# Patient Record
Sex: MALE | Race: WHITE | NOT HISPANIC OR LATINO | URBAN - METROPOLITAN AREA
[De-identification: names, ages, dates, MRNs, and addresses within clinical notes are randomized per-mention and may not be internally consistent; named-entity substitution may affect disease eponyms.]

---

## 2021-06-24 ENCOUNTER — OFFICE (OUTPATIENT)
Dept: URBAN - METROPOLITAN AREA CLINIC 67 | Facility: CLINIC | Age: 61
End: 2021-06-24

## 2021-06-24 VITALS
WEIGHT: 219 LBS | DIASTOLIC BLOOD PRESSURE: 80 MMHG | TEMPERATURE: 97.4 F | SYSTOLIC BLOOD PRESSURE: 150 MMHG | HEART RATE: 85 BPM | HEIGHT: 71 IN

## 2021-06-24 DIAGNOSIS — Z86.010 PERSONAL HISTORY OF COLONIC POLYPS: ICD-10-CM

## 2021-06-24 DIAGNOSIS — K50.80 CROHN'S DISEASE OF BOTH SMALL AND LARGE INTESTINE WITHOUT CO: ICD-10-CM

## 2021-06-24 DIAGNOSIS — Z90.49 ACQUIRED ABSENCE OF OTHER SPECIFIED PARTS OF DIGESTIVE TRACT: ICD-10-CM

## 2021-06-24 PROCEDURE — 99214 OFFICE O/P EST MOD 30 MIN: CPT | Performed by: SPECIALIST

## 2021-12-08 ENCOUNTER — OFFICE (OUTPATIENT)
Dept: URBAN - METROPOLITAN AREA CLINIC 67 | Facility: CLINIC | Age: 61
End: 2021-12-08

## 2021-12-08 VITALS
TEMPERATURE: 97.3 F | WEIGHT: 226 LBS | SYSTOLIC BLOOD PRESSURE: 126 MMHG | OXYGEN SATURATION: 97 % | HEART RATE: 87 BPM | DIASTOLIC BLOOD PRESSURE: 80 MMHG | HEIGHT: 71 IN

## 2021-12-08 DIAGNOSIS — K92.1 MELENA: ICD-10-CM

## 2021-12-08 DIAGNOSIS — K50.90 CROHN'S DISEASE, UNSPECIFIED, WITHOUT COMPLICATIONS: ICD-10-CM

## 2021-12-08 PROCEDURE — 99214 OFFICE O/P EST MOD 30 MIN: CPT | Performed by: SPECIALIST

## 2021-12-08 RX ORDER — SODIUM SULFATE, POTASSIUM SULFATE, MAGNESIUM SULFATE 17.5; 3.13; 1.6 G/ML; G/ML; G/ML
SOLUTION, CONCENTRATE ORAL
Qty: 1 | Refills: 0 | Status: ACTIVE
Start: 2021-12-08

## 2021-12-08 NOTE — SERVICEHPINOTES
You Trivedi   is seen today for a follow-up visit.     Medications have included Humira. Reason for encounter: for routine follow-up. ileum and ascending colon. Surgical history: ileal resection with ileocolonic anastomosis. Current medication use: compliant with dosing regimen and experiencing no side effects. The onset of the Crohn's Disease has been gradual and has been occurring in a decreasing pattern for years (several). The course has been rapidly improving. The Crohn's Disease is described as moderate. The frequency of bowel movements has been 4 per day (4-5). The stools are loose and not formed. Previous evaluations have included colonoscopy (5/09 ileocolonic anastomosis with ulceration and stenosis. 9/2014. 8/2019). Impact of disease: impact on work/school-mild. The Crohn's Disease are characterized as completely controlled. Patient has been compliant with post operative instructions. Patient has Crohn's disease in remission. Note for "Crohn's Disease": On Humira and doing well. Denies abd pain, hematochezia, wt loss. 4 BMs per day, semi solid. 
br
br   3 weeks ago had 3 episodes of blood in stool.  one day. moderate.  No abdominal pain.

## 2021-12-15 ENCOUNTER — AMBULATORY SURGICAL CENTER (OUTPATIENT)
Dept: URBAN - METROPOLITAN AREA SURGERY 19 | Facility: SURGERY | Age: 61
End: 2021-12-15
Payer: COMMERCIAL

## 2021-12-15 DIAGNOSIS — K92.1 MELENA: ICD-10-CM

## 2021-12-15 LAB
COLONOSCOPY STUDY: (no result)
COLONOSCOPY STUDY: (no result)

## 2021-12-15 PROCEDURE — 45378 DIAGNOSTIC COLONOSCOPY: CPT | Performed by: SPECIALIST

## 2022-06-16 ENCOUNTER — OFFICE (OUTPATIENT)
Dept: URBAN - METROPOLITAN AREA CLINIC 67 | Facility: CLINIC | Age: 62
End: 2022-06-16

## 2022-06-16 VITALS
DIASTOLIC BLOOD PRESSURE: 84 MMHG | WEIGHT: 214 LBS | HEART RATE: 87 BPM | OXYGEN SATURATION: 97 % | HEIGHT: 71 IN | SYSTOLIC BLOOD PRESSURE: 132 MMHG

## 2022-06-16 DIAGNOSIS — Z90.49 ACQUIRED ABSENCE OF OTHER SPECIFIED PARTS OF DIGESTIVE TRACT: ICD-10-CM

## 2022-06-16 DIAGNOSIS — Z86.010 PERSONAL HISTORY OF COLONIC POLYPS: ICD-10-CM

## 2022-06-16 DIAGNOSIS — K50.80 CROHN'S DISEASE OF BOTH SMALL AND LARGE INTESTINE WITHOUT CO: ICD-10-CM

## 2022-06-16 PROCEDURE — 99214 OFFICE O/P EST MOD 30 MIN: CPT | Performed by: SPECIALIST

## 2022-07-27 ENCOUNTER — INPATIENT HOSPITAL (OUTPATIENT)
Dept: URBAN - METROPOLITAN AREA MEDICAL CENTER 11 | Facility: MEDICAL CENTER | Age: 62
End: 2022-07-27
Payer: COMMERCIAL

## 2022-07-27 DIAGNOSIS — D72.829 ELEVATED WHITE BLOOD CELL COUNT, UNSPECIFIED: ICD-10-CM

## 2022-07-27 DIAGNOSIS — R93.5 ABNORMAL FINDINGS ON DIAGNOSTIC IMAGING OF OTHER ABDOMINAL R: ICD-10-CM

## 2022-07-27 DIAGNOSIS — I10 ESSENTIAL (PRIMARY) HYPERTENSION: ICD-10-CM

## 2022-07-27 DIAGNOSIS — K56.609 UNSPECIFIED INTESTINAL OBSTRUCTION, UNSPECIFIED AS TO PARTIA: ICD-10-CM

## 2022-07-27 DIAGNOSIS — Z98.890 OTHER SPECIFIED POSTPROCEDURAL STATES: ICD-10-CM

## 2022-07-27 DIAGNOSIS — K50.812 CROHN'S DISEASE OF BOTH SMALL AND LARGE INTESTINE WITH INTES: ICD-10-CM

## 2022-07-27 PROCEDURE — 99232 SBSQ HOSP IP/OBS MODERATE 35: CPT | Performed by: SPECIALIST

## 2022-07-27 PROCEDURE — 99222 1ST HOSP IP/OBS MODERATE 55: CPT | Performed by: SPECIALIST

## 2022-07-29 ENCOUNTER — INPATIENT HOSPITAL (OUTPATIENT)
Dept: URBAN - METROPOLITAN AREA MEDICAL CENTER 11 | Facility: MEDICAL CENTER | Age: 62
End: 2022-07-29

## 2022-07-29 DIAGNOSIS — E80.7 DISORDER OF BILIRUBIN METABOLISM, UNSPECIFIED: ICD-10-CM

## 2022-07-29 DIAGNOSIS — K50.812 CROHN'S DISEASE OF BOTH SMALL AND LARGE INTESTINE WITH INTES: ICD-10-CM

## 2022-07-29 PROCEDURE — 99232 SBSQ HOSP IP/OBS MODERATE 35: CPT | Performed by: SPECIALIST

## 2022-08-24 ENCOUNTER — OFFICE (OUTPATIENT)
Dept: URBAN - METROPOLITAN AREA CLINIC 67 | Facility: CLINIC | Age: 62
End: 2022-08-24

## 2022-08-24 VITALS
SYSTOLIC BLOOD PRESSURE: 124 MMHG | HEART RATE: 106 BPM | HEIGHT: 71 IN | WEIGHT: 217 LBS | DIASTOLIC BLOOD PRESSURE: 76 MMHG

## 2022-08-24 DIAGNOSIS — Z87.19 PERSONAL HISTORY OF OTHER DISEASES OF THE DIGESTIVE SYSTEM: ICD-10-CM

## 2022-08-24 DIAGNOSIS — K50.80 CROHN'S DISEASE OF BOTH SMALL AND LARGE INTESTINE WITHOUT CO: ICD-10-CM

## 2022-08-24 PROCEDURE — 99215 OFFICE O/P EST HI 40 MIN: CPT | Performed by: SPECIALIST

## 2022-08-24 RX ORDER — SODIUM SULFATE, POTASSIUM SULFATE, MAGNESIUM SULFATE 17.5; 3.13; 1.6 G/ML; G/ML; G/ML
SOLUTION, CONCENTRATE ORAL
Qty: 1 | Refills: 0 | Status: COMPLETED
Start: 2022-08-24 | End: 2022-11-29

## 2022-11-02 ENCOUNTER — AMBULATORY SURGICAL CENTER (OUTPATIENT)
Dept: URBAN - METROPOLITAN AREA SURGERY 19 | Facility: SURGERY | Age: 62
End: 2022-11-02
Payer: COMMERCIAL

## 2022-11-02 ENCOUNTER — OFFICE (OUTPATIENT)
Dept: URBAN - METROPOLITAN AREA PATHOLOGY 24 | Facility: PATHOLOGY | Age: 62
End: 2022-11-02
Payer: COMMERCIAL

## 2022-11-02 DIAGNOSIS — K50.10 CROHN'S DISEASE OF LARGE INTESTINE WITHOUT COMPLICATIONS: ICD-10-CM

## 2022-11-02 LAB
COLONOSCOPY STUDY: (no result)
COLONOSCOPY STUDY: (no result)

## 2022-11-02 PROCEDURE — 88305 TISSUE EXAM BY PATHOLOGIST: CPT | Performed by: STUDENT IN AN ORGANIZED HEALTH CARE EDUCATION/TRAINING PROGRAM

## 2022-11-02 PROCEDURE — 88342 IMHCHEM/IMCYTCHM 1ST ANTB: CPT | Performed by: STUDENT IN AN ORGANIZED HEALTH CARE EDUCATION/TRAINING PROGRAM

## 2022-11-02 PROCEDURE — 45380 COLONOSCOPY AND BIOPSY: CPT | Performed by: SPECIALIST

## 2022-11-29 ENCOUNTER — OFFICE (OUTPATIENT)
Dept: URBAN - METROPOLITAN AREA CLINIC 67 | Facility: CLINIC | Age: 62
End: 2022-11-29
Payer: COMMERCIAL

## 2022-11-29 VITALS
HEIGHT: 71 IN | SYSTOLIC BLOOD PRESSURE: 154 MMHG | RESPIRATION RATE: 14 BRPM | DIASTOLIC BLOOD PRESSURE: 97 MMHG | TEMPERATURE: 98 F | DIASTOLIC BLOOD PRESSURE: 88 MMHG | WEIGHT: 228 LBS | SYSTOLIC BLOOD PRESSURE: 149 MMHG | OXYGEN SATURATION: 95 % | TEMPERATURE: 97.7 F | SYSTOLIC BLOOD PRESSURE: 161 MMHG | HEART RATE: 73 BPM | OXYGEN SATURATION: 98 % | HEART RATE: 78 BPM | DIASTOLIC BLOOD PRESSURE: 93 MMHG

## 2022-11-29 DIAGNOSIS — K50.90 CROHN'S DISEASE, UNSPECIFIED, WITHOUT COMPLICATIONS: ICD-10-CM

## 2022-11-29 PROCEDURE — 96365 THER/PROPH/DIAG IV INF INIT: CPT | Performed by: SPECIALIST

## 2022-11-29 RX ADMIN — USTEKINUMAB 520 MG: 130 SOLUTION INTRAVENOUS at 14:40

## 2022-11-29 NOTE — SERVICEHPINOTES
See follow-up visit from:   8/24/2022   
br   Date &amp Results of last TB test:   10/6/2022     Negative   
br   Labs and/or Additional Orders: 
br Insurance authorization: *sampled from [employer]*br Pharmacy:   [facility]   
br   Rate of Infusion:  Standard  brInfusion order placed on:  9/30/2022   
br 
Time out performed with:  Tawanda Sommer

## 2023-01-24 ENCOUNTER — OFFICE (OUTPATIENT)
Dept: URBAN - METROPOLITAN AREA CLINIC 67 | Facility: CLINIC | Age: 63
End: 2023-01-24

## 2023-01-24 VITALS — HEIGHT: 71 IN

## 2023-01-24 DIAGNOSIS — K50.90 CROHN'S DISEASE, UNSPECIFIED, WITHOUT COMPLICATIONS: ICD-10-CM

## 2023-01-24 PROCEDURE — 99211 OFF/OP EST MAY X REQ PHY/QHP: CPT | Performed by: SPECIALIST

## 2023-01-24 NOTE — SERVICENOTES
Patient watched 5 minute video on self-injection with Stelara and successfully injected his first 90mg SC dose.  F/U visit scheduled with Portia Noel NP.

## 2023-02-28 ENCOUNTER — OFFICE (OUTPATIENT)
Dept: URBAN - METROPOLITAN AREA CLINIC 67 | Facility: CLINIC | Age: 63
End: 2023-02-28

## 2023-02-28 VITALS
DIASTOLIC BLOOD PRESSURE: 84 MMHG | HEIGHT: 71 IN | HEART RATE: 49 BPM | SYSTOLIC BLOOD PRESSURE: 152 MMHG | RESPIRATION RATE: 14 BRPM | WEIGHT: 228 LBS

## 2023-02-28 DIAGNOSIS — K58.0 IRRITABLE BOWEL SYNDROME WITH DIARRHEA: ICD-10-CM

## 2023-02-28 DIAGNOSIS — R14.0 ABDOMINAL DISTENSION (GASEOUS): ICD-10-CM

## 2023-02-28 DIAGNOSIS — E55.9 VITAMIN D DEFICIENCY, UNSPECIFIED: ICD-10-CM

## 2023-02-28 DIAGNOSIS — K50.90 CROHN'S DISEASE, UNSPECIFIED, WITHOUT COMPLICATIONS: ICD-10-CM

## 2023-02-28 PROCEDURE — 99214 OFFICE O/P EST MOD 30 MIN: CPT

## 2023-02-28 RX ORDER — RIFAXIMIN 550 MG/1
1650 TABLET ORAL
Qty: 42 | Refills: 0 | Status: ACTIVE
Start: 2023-02-28

## 2023-03-03 ENCOUNTER — APPOINTMENT (OUTPATIENT)
Dept: URBAN - METROPOLITAN AREA SURGERY 11 | Age: 63
Setting detail: DERMATOLOGY
End: 2023-03-03

## 2023-03-03 DIAGNOSIS — L0391 CELLULITIS AND ABSCESS OF UNSPECIFIED SITES: ICD-10-CM

## 2023-03-03 DIAGNOSIS — L0390 CELLULITIS AND ABSCESS OF UNSPECIFIED SITES: ICD-10-CM

## 2023-03-03 DIAGNOSIS — L21.8 OTHER SEBORRHEIC DERMATITIS: ICD-10-CM

## 2023-03-03 DIAGNOSIS — L82.1 OTHER SEBORRHEIC KERATOSIS: ICD-10-CM

## 2023-03-03 PROBLEM — L03.818 CELLULITIS OF OTHER SITES: Status: ACTIVE | Noted: 2023-03-03

## 2023-03-03 PROCEDURE — 99204 OFFICE O/P NEW MOD 45 MIN: CPT

## 2023-03-03 PROCEDURE — OTHER PRESCRIPTION: OTHER

## 2023-03-03 PROCEDURE — OTHER COUNSELING: OTHER

## 2023-03-03 PROCEDURE — OTHER MIPS QUALITY: OTHER

## 2023-03-03 PROCEDURE — OTHER MEDICATION COUNSELING: OTHER

## 2023-03-03 RX ORDER — CIPROFLOXACIN HYDROCHLORIDE 500 MG/1
TABLET, FILM COATED ORAL
Qty: 14 | Refills: 0 | Status: ERX | COMMUNITY
Start: 2023-03-03

## 2023-03-03 RX ORDER — CLOBETASOL PROPIONATE 0.5 MG/G
OINTMENT TOPICAL
Qty: 60 | Refills: 0 | Status: ERX | COMMUNITY
Start: 2023-03-03

## 2023-03-03 ASSESSMENT — LOCATION SIMPLE DESCRIPTION DERM
LOCATION SIMPLE: SCALP
LOCATION SIMPLE: RIGHT TEMPLE
LOCATION SIMPLE: RIGHT EAR
LOCATION SIMPLE: RIGHT PRETIBIAL REGION

## 2023-03-03 ASSESSMENT — LOCATION ZONE DERM
LOCATION ZONE: SCALP
LOCATION ZONE: LEG
LOCATION ZONE: FACE
LOCATION ZONE: EAR

## 2023-03-03 ASSESSMENT — LOCATION DETAILED DESCRIPTION DERM
LOCATION DETAILED: RIGHT PROXIMAL PRETIBIAL REGION
LOCATION DETAILED: RIGHT INFERIOR FRONTAL SCALP
LOCATION DETAILED: RIGHT POSTAURICULAR CREASE
LOCATION DETAILED: RIGHT LATERAL TEMPLE

## 2023-03-03 NOTE — PROCEDURE: MEDICATION COUNSELING
Controlled with current regime Libtayo Counseling- I discussed with the patient the risks of Libtayo including but not limited to nausea, vomiting, diarrhea, and bone or muscle pain.  The patient verbalized understanding of the proper use and possible adverse effects of Libtayo.  All of the patient's questions and concerns were addressed.

## 2023-03-03 NOTE — PROCEDURE: MIPS QUALITY
Start Norvasc 2.5 mg po daily.  Continue to monitor BP, HR.
Detail Level: Detailed
Quality 110: Preventive Care And Screening: Influenza Immunization: Influenza Immunization Administered during Influenza season

## 2023-03-03 NOTE — PROCEDURE: MEDICATION COUNSELING
details… Siliq Counseling:  I discussed with the patient the risks of Siliq including but not limited to new or worsening depression, suicidal thoughts and behavior, immunosuppression, malignancy, posterior leukoencephalopathy syndrome, and serious infections.  The patient understands that monitoring is required including a PPD at baseline and must alert us or the primary physician if symptoms of infection or other concerning signs are noted. There is also a special program designed to monitor depression which is required with Siliq.

## 2023-03-21 ENCOUNTER — APPOINTMENT (OUTPATIENT)
Dept: URBAN - METROPOLITAN AREA SURGERY 11 | Age: 63
Setting detail: DERMATOLOGY
End: 2023-03-21

## 2023-03-21 DIAGNOSIS — L81.4 OTHER MELANIN HYPERPIGMENTATION: ICD-10-CM

## 2023-03-21 DIAGNOSIS — L82.1 OTHER SEBORRHEIC KERATOSIS: ICD-10-CM

## 2023-03-21 DIAGNOSIS — D22 MELANOCYTIC NEVI: ICD-10-CM

## 2023-03-21 DIAGNOSIS — L0390 CELLULITIS AND ABSCESS OF UNSPECIFIED SITES: ICD-10-CM

## 2023-03-21 DIAGNOSIS — L57.0 ACTINIC KERATOSIS: ICD-10-CM

## 2023-03-21 DIAGNOSIS — L0391 CELLULITIS AND ABSCESS OF UNSPECIFIED SITES: ICD-10-CM

## 2023-03-21 PROBLEM — D22.5 MELANOCYTIC NEVI OF TRUNK: Status: ACTIVE | Noted: 2023-03-21

## 2023-03-21 PROBLEM — L03.818 CELLULITIS OF OTHER SITES: Status: ACTIVE | Noted: 2023-03-21

## 2023-03-21 PROCEDURE — OTHER COUNSELING: OTHER

## 2023-03-21 PROCEDURE — 17000 DESTRUCT PREMALG LESION: CPT

## 2023-03-21 PROCEDURE — 99213 OFFICE O/P EST LOW 20 MIN: CPT | Mod: 25

## 2023-03-21 PROCEDURE — OTHER LIQUID NITROGEN: OTHER

## 2023-03-21 PROCEDURE — OTHER MIPS QUALITY: OTHER

## 2023-03-21 ASSESSMENT — LOCATION DETAILED DESCRIPTION DERM
LOCATION DETAILED: RIGHT MEDIAL SUPERIOR CHEST
LOCATION DETAILED: LEFT SUPERIOR POSTERIOR HELIX
LOCATION DETAILED: EPIGASTRIC SKIN
LOCATION DETAILED: PERIUMBILICAL SKIN
LOCATION DETAILED: SUBXIPHOID
LOCATION DETAILED: RIGHT POSTAURICULAR CREASE

## 2023-03-21 ASSESSMENT — LOCATION SIMPLE DESCRIPTION DERM
LOCATION SIMPLE: LEFT EAR
LOCATION SIMPLE: CHEST
LOCATION SIMPLE: ABDOMEN
LOCATION SIMPLE: ABDOMEN
LOCATION SIMPLE: RIGHT EAR

## 2023-03-21 ASSESSMENT — LOCATION ZONE DERM
LOCATION ZONE: EAR
LOCATION ZONE: TRUNK
LOCATION ZONE: TRUNK

## 2023-03-21 NOTE — PROCEDURE: LIQUID NITROGEN
Render Note In Bullet Format When Appropriate: No
Duration Of Freeze Thaw-Cycle (Seconds): 15
Post-Care Instructions: I reviewed with the patient in detail post-care instructions. Patient is to wear sunprotection, and avoid picking at any of the treated lesions. Pt may apply Vaseline to crusted or scabbing areas.
Show Applicator Variable?: Yes
Consent: The patient's consent was obtained including but not limited to risks of crusting, scabbing, blistering, scarring, darker or lighter pigmentary change, recurrence, incomplete removal and infection.
Detail Level: Detailed

## 2023-07-11 ENCOUNTER — OFFICE (OUTPATIENT)
Dept: URBAN - METROPOLITAN AREA CLINIC 67 | Facility: CLINIC | Age: 63
End: 2023-07-11

## 2023-07-11 VITALS
HEIGHT: 71 IN | WEIGHT: 225 LBS | DIASTOLIC BLOOD PRESSURE: 88 MMHG | OXYGEN SATURATION: 98 % | SYSTOLIC BLOOD PRESSURE: 130 MMHG | HEART RATE: 86 BPM

## 2023-07-11 DIAGNOSIS — K58.0 IRRITABLE BOWEL SYNDROME WITH DIARRHEA: ICD-10-CM

## 2023-07-11 DIAGNOSIS — R15.2 FECAL URGENCY: ICD-10-CM

## 2023-07-11 DIAGNOSIS — K52.9 NONINFECTIVE GASTROENTERITIS AND COLITIS, UNSPECIFIED: ICD-10-CM

## 2023-07-11 DIAGNOSIS — R10.84 GENERALIZED ABDOMINAL PAIN: ICD-10-CM

## 2023-07-11 DIAGNOSIS — K50.80 CROHN'S DISEASE OF BOTH SMALL AND LARGE INTESTINE WITHOUT CO: ICD-10-CM

## 2023-07-11 DIAGNOSIS — E55.9 VITAMIN D DEFICIENCY, UNSPECIFIED: ICD-10-CM

## 2023-07-11 PROCEDURE — 99214 OFFICE O/P EST MOD 30 MIN: CPT

## 2023-07-11 RX ORDER — DICYCLOMINE HYDROCHLORIDE 10 MG/1
CAPSULE ORAL
Qty: 90 | Refills: 1 | Status: COMPLETED
Start: 2023-07-11 | End: 2024-05-09

## 2023-07-11 NOTE — SERVICENOTES
1. Continue Stelara. Travis Darby score 3
2. Plan for bone density at [facility]
3. Try over the counter fiber, like benefiber or metamucil, daily. 1 teaspoon or gummy. 
4.  600-800IU of vitamin D daily
5. Will send in dicyclomine to try before meals to help with urgency. 
6. Labs today.
7. colonoscopy to confirm remission after starting Stelara earlier this year.

## 2023-07-11 NOTE — SERVICEHPINOTES
You Trivedi   is seen today for a follow-up visit.    Lou returns to discuss Crohns. br-insurance needs a Travis Darby index form completed for renewing his stelara.br-.well being- 0
br abdominal pain- 0
br # of liquid stools- 3
br Abdominal mass- 0
br arthralgia- 0
br uveitis- 0
br erythema nodosum- 0
br aphthous ulcers- 0
br pyoderma gangrenosum- 0
br anal fissure- 0
br fistula- 0
br [id number] trialed him on xifaxan at last OV for potential IBS-diarrhea, and planned for 3 month f/u with calprotectin stool test. He reports urgency/loose stools after eating. No pain. Has had several inches of colon removed. Denies blood in the stools. Symptoms feel the same as when he was on Humira in years past. 1st injection of Stelara was 1/24/23. br-He reports not getting DEXA since last OV. br-2/28/23- CBC okay, T bili 1.9, , Vit D 29.1, b12, folate normal. Recommend 600-800IU vitamin D replacement. He has not started vitamin D supplement.
br
br-he completed prescription cream with dermatologist for the candida behind his ear. None today. Last seen 2/28/23 with me as follows-Kapil Trivedi is seen today for a follow-up visit. Patient returns to discuss Crohns, On stelara now. From Humira in the fall.brFirst Stelara infusion was 11/29/22, first injection was 1/24/23.brHe reports doing well, 4-5 BMs daily, blood occasionally, urgency and bloating are daily. Normal for him. He reports having new fungus behind the right ear, since starting stelara, is using a topical fungal cream for this, not helping, seeing dermatologist Friday, [facility] in [place]. brReports 1 DEXA scan at the time of the crohns diagnosis. brWas hospitalized with small bowel obstruction in the fall, resolved, and completed prednisone taper around this time.Dx 2007 with Crohns, started on pentasa. Switched to Humira in 6/2009.Last OV note with Dr Freire 8/24/22 as follows-brhistory of crohn's ileocolitis post resection. rx with humira. recent admission with [facility]. transition distal small bowel. history of anastomotic stricture and ulcer on colon 2021. Rx with steroids and now on prednisone 40 mg. abdominal pain resolved. weight stable. no lapses in Humira therapy.11/2/22- colonoscopy, Dr Freire- end to end anastamosis, small ulceration, bx c/w crohns active inflammation. no [id number]- colonoscopy, Dr Freire- end to end anastamosis, small ulceration, bx c/w crohns active inflammation. no significant stenosisbr8/2019- moderate stenosis at [facility]br9/2014- moderate stenosis at [facility].

## 2023-08-09 ENCOUNTER — AMBULATORY SURGICAL CENTER (OUTPATIENT)
Dept: URBAN - METROPOLITAN AREA SURGERY 19 | Facility: SURGERY | Age: 63
End: 2023-08-09
Payer: COMMERCIAL

## 2023-08-09 DIAGNOSIS — Z98.0 INTESTINAL BYPASS AND ANASTOMOSIS STATUS: ICD-10-CM

## 2023-08-09 DIAGNOSIS — K50.80 CROHN'S DISEASE OF BOTH SMALL AND LARGE INTESTINE WITHOUT CO: ICD-10-CM

## 2023-08-09 LAB
COLONOSCOPY STUDY: (no result)
COLONOSCOPY STUDY: (no result)

## 2023-08-09 PROCEDURE — 45378 DIAGNOSTIC COLONOSCOPY: CPT | Performed by: SPECIALIST

## 2023-11-09 ENCOUNTER — OFFICE (OUTPATIENT)
Dept: URBAN - METROPOLITAN AREA CLINIC 67 | Facility: CLINIC | Age: 63
End: 2023-11-09

## 2023-11-09 VITALS
DIASTOLIC BLOOD PRESSURE: 96 MMHG | HEART RATE: 72 BPM | SYSTOLIC BLOOD PRESSURE: 150 MMHG | SYSTOLIC BLOOD PRESSURE: 154 MMHG | WEIGHT: 225 LBS | HEIGHT: 71 IN | DIASTOLIC BLOOD PRESSURE: 95 MMHG

## 2023-11-09 DIAGNOSIS — R74.8 ABNORMAL LEVELS OF OTHER SERUM ENZYMES: ICD-10-CM

## 2023-11-09 DIAGNOSIS — I10 ESSENTIAL (PRIMARY) HYPERTENSION: ICD-10-CM

## 2023-11-09 DIAGNOSIS — K50.90 CROHN'S DISEASE, UNSPECIFIED, WITHOUT COMPLICATIONS: ICD-10-CM

## 2023-11-09 DIAGNOSIS — E55.9 VITAMIN D DEFICIENCY, UNSPECIFIED: ICD-10-CM

## 2023-11-09 PROCEDURE — 99213 OFFICE O/P EST LOW 20 MIN: CPT | Performed by: NURSE PRACTITIONER

## 2023-11-09 NOTE — SERVICEHPINOTES
You Trivedi   is seen today for a follow-up visit. 
br
brToday 11/8/2023 Velvet Tirvedi returns to the clinic for follow up for Crohns. He is feeling great. No blood or mucus in stool. He has not tried Metamucil yet, but has at home. He had Colonoscopy and DEXA since last visit. His fungal infection has resolved since his last visit and seeing Dermatology. He has been playing a lot of golf and feeling good. br 
brWork up since last visit: 
br 8/9/2023 Colonoscopy with Dr. Freire- patent end to side ileo-colonic anastomosis, repeat in 3 years 
br 8/1/23 DEXA normal 
br 7/11/2023 Labs: Tbili 1.5,  br
brInsurance needs a Travis Darby index form completed for renewing his Stelara: br well being- 0br abdominal pain- 0
br # of liquid stools- 4
br Abdominal mass- 0
br arthralgia- 0
br uveitis-0  
br erythema nodosum- 0
br aphthous ulcers- 0
br pyoderma gangrenosum- 0
br anal fissure- 0
br fistula- 0
br Abscess- 0
br Total: 4
br br7/11/2023 Last Visit with ISELA Noel FNP: Lou returns to discuss Crohns. -we trialed him on xifaxan at last OV for potential IBS-diarrhea, and planned for 3 month f/u with calprotectin stool test. He reports urgency/loose stools after eating. No pain. Has had several inches of colon removed. Denies blood in the stools. Symptoms feel the same as when he was on Humira in years past. 1st injection of Stelara was 1/24/23. br-He reports not getting DEXA since last OV. br-2/28/23- CBC okay, T bili 1.9, , Vit D 29.1, b12, folate normal. Recommend 600-800IU vitamin D replacement. He has not started vitamin D supplement. -he completed prescription cream with dermatologist for the candida behind his ear. None today. Plan from 7/11/2023: br1. Continue Stelara. Travis Darby score 3 br2. Plan for bone density at [facility] br3. Try over the counter fiber, like benefiber or metamucil, daily. 1 teaspoon or gummy. br4.  600-800IU of vitamin D daily br5. Will send in dicyclomine to try before meals to help with urgency. br6. Labs today. br7. colonoscopy to confirm remission after starting Stelara earlier this year. Last seen 2/28/23 with me as follows- Kapil Trivedi is seen today for a follow-up visit. Patient returns to discuss Crohns, On stelara now. From Humira in the fall. brFirst Stelara infusion was 11/29/22, first injection was 1/24/23. brHe reports doing well, 4-5 BMs daily, blood occasionally, urgency and bloating are daily. Normal for him. He reports having new fungus behind the right ear, since starting stelara, is using a topical fungal cream for this, not helping, seeing dermatologist Friday, [facility] in [place]. brReports 1 DEXA scan at the time of the crohns diagnosis. Was hospitalized with small bowel obstruction in the fall, resolved, and completed prednisone taper around this time. Dx 2007 with Crohns, started on pentasa. Switched to Humira in 6/2009. Last OV note with Dr Freire 8/24/22 as follows- brhistory of crohn's ileocolitis post resection. rx with humira. recent admission with [facility]. transition distal small bowel. history of anastomotic stricture and ulcer on colon 2021. Rx with steroids and now on prednisone 40 mg. abdominal pain resolved. weight stable. no lapses in Humira therapy. 11/2/22- colonoscopy, Dr Freire- end to end anastamosis, small ulceration, bx c/w crohns active inflammation. no stenosis br12/15/21- colonoscopy, Dr Freire- end to end anastamosis, small ulceration, bx c/w crohns active inflammation. no significant stenosis br8/2019- moderate stenosis at TIbr9/2014- moderate stenosis at [facility].

## 2024-05-09 ENCOUNTER — OFFICE (OUTPATIENT)
Dept: URBAN - METROPOLITAN AREA CLINIC 72 | Facility: CLINIC | Age: 64
End: 2024-05-09

## 2024-05-09 VITALS
SYSTOLIC BLOOD PRESSURE: 124 MMHG | HEART RATE: 78 BPM | HEIGHT: 71 IN | DIASTOLIC BLOOD PRESSURE: 82 MMHG | WEIGHT: 202 LBS

## 2024-05-09 DIAGNOSIS — E55.9 VITAMIN D DEFICIENCY, UNSPECIFIED: ICD-10-CM

## 2024-05-09 DIAGNOSIS — I10 ESSENTIAL (PRIMARY) HYPERTENSION: ICD-10-CM

## 2024-05-09 DIAGNOSIS — K50.90 CROHN'S DISEASE, UNSPECIFIED, WITHOUT COMPLICATIONS: ICD-10-CM

## 2024-05-09 PROCEDURE — 99213 OFFICE O/P EST LOW 20 MIN: CPT | Performed by: NURSE PRACTITIONER

## 2024-05-09 NOTE — SERVICEHPINOTES
You Trivedi   is seen today for a follow-up visit.    
br br5/9/2024 Interval History: Velvet Trivedi is here today for 6 month follow up of Crohn's Disease. 
br
Jsoe is doing great and has lost over 20 pounds by starting intermittent fasting. He has 3 loose stools a day with no blood or mucus in stool. No abdominal pain. 
br
br No reflux, dysphagia, nausea, vomiting or epigastric pain. br
brDermatology- has not been this year, recommended [facility] 
brImmunizations- states he is up to dateInsurance needs a Travis Darby index form completed for renewing his Stelara:brwell being- 0 brabdominal pain- 0 br# of liquid stools- 0 brAbdominal mass- 3brarthralgia- 0 bruveitis-0 brerythema nodosum- 0 braphthous ulcers- 0 brpyoderma gangrenosum- 0 branal fissure- 0 brfistula- 0 brAbscess- 0brTotal: 3br
11/9/2023 CBC- normal. CMP- bili 1.9. Vit D-29.4. B12/Folate- normal 11/8/2023 Interval History: Velvet Trivedi returns to the clinic for follow up for Crohns. He is feeling great. No blood or mucus in stool. He has not tried Metamucil yet, but has at home. He had Colonoscopy and DEXA since last visit. His fungal infection has resolved since his last visit and seeing Dermatology. He has been playing a lot of golf and feeling good. Work up since last visit: br8/9/2023 Colonoscopy with Dr. Freire- patent end to side ileo-colonic anastomosis, repeat in 3 years br8/1/23 DEXA normal br7/11/2023 Labs: Tbili 1.5,  brPlan from 11/9/2023: br1. Continue Stelara. Travis Darby score 4 br2. Pt has not tried Metamucil yet. Will try that this week. br3. Routine labs today. Will also check Vit D with lab work br4. Pt is up to date with immunizations and DEXA, continue routine follow ups br5. RTC in 6 months for follow up and labs (will be due for TB Gold), sooner if needed br6. Colonoscopy will be due 8/2026 7/11/2023 Last Visit with ISELA Noel KATIE: Lou returns to discuss Crohns. -we trialed him on xifaxan at last OV for potential IBS-diarrhea, and planned for 3 month f/u with calprotectin stool test. He reports urgency/loose stools after eating. No pain. Has had several inches of colon removed. Denies blood in the stools. Symptoms feel the same as when he was on Humira in years past. 1st injection of Stelara was 1/24/23. br-He reports not getting DEXA since last OV. br-2/28/23- CBC okay, T bili 1.9, , Vit D 29.1, b12, folate normal. Recommend 600-800IU vitamin D replacement. He has not started vitamin D supplement. -he completed prescription cream with dermatologist for the candida behind his ear. None today. Plan from 7/11/2023: br1. Continue Stelara. Travis Darby score 3 br2. Plan for bone density at [facility] br3. Try over the counter fiber, like benefiber or metamucil, daily. 1 teaspoon or gummy. br4.  600-800IU of vitamin D daily br5. Will send in dicyclomine to try before meals to help with urgency. br6. Labs today. br7. colonoscopy to confirm remission after starting Stelara earlier this year. Last seen 2/28/23 with me as follows- Kapil Trivedi is seen today for a follow-up visit. Patient returns to discuss Crohns, On stelara now. From Humira in the fall. brFirst Stelara infusion was 11/29/22, first injection was 1/24/23. brHe reports doing well, 4-5 BMs daily, blood occasionally, urgency and bloating are daily. Normal for him. He reports having new fungus behind the right ear, since starting stelara, is using a topical fungal cream for this, not helping, seeing dermatologist Friday, [facility] in [place]. brReports 1 DEXA scan at the time of the crohns diagnosis. Was hospitalized with small bowel obstruction in the fall, resolved, and completed prednisone taper around this time. Dx 2007 with Crohns, started on pentasa. Switched to Humira in 6/2009. Last OV note with Dr Freire 8/24/22 as follows- brhistory of crohn's ileocolitis post resection. rx with humira. recent admission with [facility]. transition distal small bowel. history of anastomotic stricture and ulcer on colon 2021. Rx with steroids and now on prednisone 40 mg. abdominal pain resolved. weight stable. no lapses in Humira therapy. 11/2/22- colonoscopy, Dr Freire- end to end anastamosis, small ulceration, bx c/w crohns active inflammation. no stenosis br12/15/21- colonoscopy, Dr Freire- end to end anastamosis, small ulceration, bx c/w crohns active inflammation. no significant stenosis br8/2019- moderate stenosis at [facility] br9/2014- moderate stenosis at [facility]

## 2024-08-26 NOTE — PROCEDURE: MEDICATION COUNSELING
IMPROVE-DD Application Not Available No Azelaic Acid Counseling: Patient counseled that medicine may cause skin irritation and to avoid applying near the eyes.  In the event of skin irritation, the patient was advised to reduce the amount of the drug applied or use it less frequently.   The patient verbalized understanding of the proper use and possible adverse effects of azelaic acid.  All of the patient's questions and concerns were addressed.

## 2024-12-27 ENCOUNTER — OFFICE (OUTPATIENT)
Dept: URBAN - METROPOLITAN AREA CLINIC 67 | Facility: CLINIC | Age: 64
End: 2024-12-27
Payer: COMMERCIAL

## 2024-12-27 VITALS — WEIGHT: 221 LBS | HEIGHT: 71 IN

## 2024-12-27 DIAGNOSIS — K76.0 FATTY (CHANGE OF) LIVER, NOT ELSEWHERE CLASSIFIED: ICD-10-CM

## 2024-12-27 DIAGNOSIS — R93.2 ABNORMAL FINDINGS ON DIAGNOSTIC IMAGING OF LIVER AND BILIARY: ICD-10-CM

## 2024-12-27 PROCEDURE — 76981 USE PARENCHYMA: CPT | Performed by: NURSE PRACTITIONER

## 2025-05-22 ENCOUNTER — OFFICE (OUTPATIENT)
Dept: URBAN - METROPOLITAN AREA CLINIC 72 | Facility: CLINIC | Age: 65
End: 2025-05-22
Payer: COMMERCIAL

## 2025-05-22 VITALS — HEIGHT: 71 IN | WEIGHT: 222 LBS

## 2025-05-22 DIAGNOSIS — E55.9 VITAMIN D DEFICIENCY, UNSPECIFIED: ICD-10-CM

## 2025-05-22 DIAGNOSIS — K76.0 FATTY (CHANGE OF) LIVER, NOT ELSEWHERE CLASSIFIED: ICD-10-CM

## 2025-05-22 DIAGNOSIS — K50.90 CROHN'S DISEASE, UNSPECIFIED, WITHOUT COMPLICATIONS: ICD-10-CM

## 2025-05-22 DIAGNOSIS — Z09 ENCOUNTER FOR FOLLOW-UP EXAMINATION AFTER COMPLETED TREATMEN: ICD-10-CM

## 2025-05-22 DIAGNOSIS — Z86.0100 PERSONAL HISTORY OF COLON POLYPS, UNSPECIFIED: ICD-10-CM

## 2025-05-22 PROCEDURE — 99214 OFFICE O/P EST MOD 30 MIN: CPT | Performed by: NURSE PRACTITIONER
